# Patient Record
Sex: FEMALE | Race: WHITE | Employment: OTHER | URBAN - METROPOLITAN AREA
[De-identification: names, ages, dates, MRNs, and addresses within clinical notes are randomized per-mention and may not be internally consistent; named-entity substitution may affect disease eponyms.]

---

## 2023-04-27 ENCOUNTER — OFFICE VISIT (OUTPATIENT)
Dept: UROGYNECOLOGY | Age: 63
End: 2023-04-27
Payer: COMMERCIAL

## 2023-04-27 VITALS — WEIGHT: 191 LBS

## 2023-04-27 DIAGNOSIS — N39.41 URGE URINARY INCONTINENCE: Primary | ICD-10-CM

## 2023-04-27 DIAGNOSIS — R15.9 INCONTINENCE OF FECES, UNSPECIFIED FECAL INCONTINENCE TYPE: ICD-10-CM

## 2023-04-27 LAB
BILIRUBIN, URINE, POC: NEGATIVE
BLOOD URINE, POC: NEGATIVE
GLUCOSE URINE, POC: NEGATIVE
KETONES, URINE, POC: NEGATIVE
LEUKOCYTE ESTERASE, URINE, POC: NEGATIVE
NITRITE, URINE, POC: NEGATIVE
PH, URINE, POC: 6 (ref 4.6–8)
PROTEIN,URINE, POC: NEGATIVE
SPECIFIC GRAVITY, URINE, POC: 1.01 (ref 1–1.03)
URINALYSIS CLARITY, POC: CLEAR
URINALYSIS COLOR, POC: YELLOW
UROBILINOGEN, POC: NORMAL

## 2023-04-27 PROCEDURE — 81003 URINALYSIS AUTO W/O SCOPE: CPT | Performed by: OBSTETRICS & GYNECOLOGY

## 2023-04-27 PROCEDURE — 99204 OFFICE O/P NEW MOD 45 MIN: CPT | Performed by: OBSTETRICS & GYNECOLOGY

## 2023-04-27 PROCEDURE — 1123F ACP DISCUSS/DSCN MKR DOCD: CPT | Performed by: OBSTETRICS & GYNECOLOGY

## 2023-04-27 PROCEDURE — 51701 INSERT BLADDER CATHETER: CPT | Performed by: OBSTETRICS & GYNECOLOGY

## 2023-04-27 RX ORDER — ERGOCALCIFEROL 1.25 MG/1
CAPSULE ORAL
COMMUNITY
Start: 2023-04-17

## 2023-04-27 RX ORDER — CYANOCOBALAMIN (VITAMIN B-12) 500 MCG
TABLET ORAL
COMMUNITY

## 2023-04-27 RX ORDER — LEVOTHYROXINE SODIUM 112 UG/1
112 TABLET ORAL DAILY
COMMUNITY
Start: 2023-03-30

## 2023-04-27 RX ORDER — ALOSETRON HYDROCHLORIDE 0.5 MG/1
TABLET, FILM COATED ORAL
COMMUNITY
Start: 2023-04-22

## 2023-04-27 RX ORDER — NORETHINDRONE ACETATE AND ETHINYL ESTRADIOL .5; 2.5 MG/1; UG/1
TABLET ORAL
COMMUNITY
Start: 2020-03-23

## 2023-04-27 RX ORDER — ESCITALOPRAM OXALATE 10 MG/1
10 TABLET ORAL DAILY
COMMUNITY
Start: 2023-02-09

## 2023-04-27 RX ORDER — ASPIRIN 81 MG/1
TABLET, CHEWABLE ORAL
COMMUNITY

## 2023-04-27 RX ORDER — ESOMEPRAZOLE MAGNESIUM 40 MG/1
CAPSULE, DELAYED RELEASE ORAL DAILY
COMMUNITY
Start: 2023-02-26

## 2023-04-27 RX ORDER — SOLIFENACIN SUCCINATE 10 MG/1
1 TABLET, FILM COATED ORAL DAILY
COMMUNITY
Start: 2018-03-26

## 2023-04-27 RX ORDER — PNV NO.95/FERROUS FUM/FOLIC AC 28MG-0.8MG
TABLET ORAL
COMMUNITY

## 2023-04-27 NOTE — PATIENT INSTRUCTIONS
We discussed the differential diagnosis of fecal incontinence including stress, urge, and unconscious leakage. We discussed the implications of better coordination of the pelvic floor. We discussed the implications of better coordination of the pelvic floor. We discussed management of potential sphincter defects. We discussed the importance of stool consistency and the role of diet modification. The initial treatment of fecal incontinence is assuring that there is enough fiber in the diet. A high fiber diet with plenty of fluids (up to 8 glasses of water daily) is suggested to relieve these symptoms. Citrucel, Metamucil, Bene Fiber, Fibercon etc, are great supplements. The goal is to slowly work up to 25-30g of fiber daily. Patient Education        mirabegron  Pronunciation:  ARI braun  Brand:  Myrbetriq  What is the most important information I should know about mirabegron? You should not use mirabegron if you have uncontrolled hypertension (high blood pressure). What is mirabegron? Mirabegron is used in adults to treat overactive bladder with symptoms of frequent or urgent urination and urinary incontinence. Mirabegron is sometimes used together with another medicine called solifenacin (Vesicare). Mirabegron is used in children to treat neurogenic detrusor overactivity (NDO). Urinary incontinence caused by NDO is related to permanent nerve damage from conditions such as multiple sclerosis or spinal injury. Mirabegron tablets may be used in children at least 1years old. Mirabegron granules are for use in children at least 1years old who also weigh at least 77 pounds (35 kilograms). Mirabegron may also be used for purposes not listed in this medication guide. What should I discuss with my healthcare provider before taking mirabegron? You should not use mirabegron if you are allergic to it.   Tell your doctor if you have ever had:  high blood pressure;  a bladder obstruction;  trouble emptying

## 2023-04-27 NOTE — ASSESSMENT & PLAN NOTE
We discussed the differential diagnosis of overactive bladder. We discussed the epidemiology, pathogenesis, and etiology of bladder overactivity including the neurophysiologic axis. We also discussed the treatment pathway for OAB. I offered options which include nothing, medications, physical therapy, behavior modification, and neuromodulation. 1) Decrease bladder irritants  2) Physical Therapy: We discussed the purpose of physical therapy which is to strengthen the pelvic floor muscles and teach proper coordination of those muscles. I described the anatomy of those muscles involved and their relationship to the end-organs in the pelvis. I described therapy techniques which include a combination of therapeutic exercise, biofeedback, neuromuscular re-education, home programs, and electrical stimulation, as well as therapeutic massage and ultrasound for pain. She is on vesicare right now. We discussed that all medications in this class have similar efficacy. Side effects include dry eyes, dry mouth, constipation, difficulty with swallowing, blurry vision, and may lead to memory dysfunction. These medications are contraindicated in patients with closed/ narrow angle glaucoma, gastroparesis, dementia, Sjogren and Myasthenia gravis. She is aware of the risks and side effects.

## 2023-04-27 NOTE — PROGRESS NOTES
[facility]  [address]  Dept: [phone]        PCP:  Angely Lu MD    4/27/2023        HPI:  I am being asked to see this patient in consultation by Dr. Craig Maurice   for New Patient      Ms. Wolfgang Denver  has been leaking urine for 3 years. She leaks urine daytime. She does leak urine with cough, laugh, sneeze and activity even after urinating when she stands she have urine coming her leg. . She does leak urine with urgency. She  uses medium on long distant travel or during meetings and goes through 1. She leaks twice a week. When she leaks she leaks small amounts. She has not tried PT, she is taking  medication Vesicare for 10 years plus. She has not had procedures/surgery for this in the past.     With both urge incontinence and stress incontinence, urge incontinence bothers her the most.     Ms. Wolfgang Denver has been having bowel leakage for 3 years. When she leaks stool the stool is watery. She does leak stool with urgency. She  uses medium and goes through 1. She leaks 3 times per month. When she leaks she leaks moderate amounts. She does not have blood in her stool. She has not tried PT, she has not tried medication. She has not had procedures/surgery for this in the past.   She is currently taking medication for IBS, Alosetron      She voids 15 times during the day. She voids 1 times over night. She has 21 BM per week, and does not strain. She drinks 2 caffeine drinks beverages per day. She uses 0 artificial sweeteners per day. She drinks 4 alcoholic beverages per week. She has not pelvic surgery in the past.   Her last PAP: 05/13/2018    Her last Colonoscopy: 2016  Her last Mammogram: 202    She does not have a history of DM. No results found for: LABA1C  No results found for: EAG    She does have a history of sleep apnea. Tobacco: No    Sexual History: single partner, contraception - none.     Notes were reviewed from

## 2023-06-07 ENCOUNTER — OFFICE VISIT (OUTPATIENT)
Dept: UROGYNECOLOGY | Age: 63
End: 2023-06-07
Payer: COMMERCIAL

## 2023-06-07 DIAGNOSIS — R15.9 INCONTINENCE OF FECES, UNSPECIFIED FECAL INCONTINENCE TYPE: ICD-10-CM

## 2023-06-07 DIAGNOSIS — N39.41 URGE URINARY INCONTINENCE: ICD-10-CM

## 2023-06-07 PROCEDURE — 99212 OFFICE O/P EST SF 10 MIN: CPT | Performed by: OBSTETRICS & GYNECOLOGY

## 2023-06-07 PROCEDURE — 1123F ACP DISCUSS/DSCN MKR DOCD: CPT | Performed by: OBSTETRICS & GYNECOLOGY

## 2023-06-07 NOTE — PROGRESS NOTES
[facility]  [address]  Dept: [phone]    PCP:  Vamsi Powell MD    6/7/2023      HPI:  Kenna Guallpa is here to follow up on Follow-up (Myrbetriq follow up for UUI)  . Patient is here for a Myrbetriq 50mg, follow up, she decreased her fluids while on a trip back in May she is continued to do so when she have long days, unsure if the medication is helping. She is still working on the bladder diet and decrease caffeine intake. She is continued her PT with Jose Lara at [facility] in [place]. She is still taking it daily. She has had 2 PT apts. No results found for this visit on 06/07/23. There were no vitals taken for this visit. 1. Urge urinary incontinence  Assessment & Plan:  She is going to cont with PT and HEP. She is also going to cont Myrbetriq. 2. Incontinence of feces, unspecified fecal incontinence type  Assessment & Plan:  Cont with metamucil. .       Return in about 3 months (around 9/7/2023) for UUI.                       Sandhya Elam DO

## 2023-09-20 ENCOUNTER — OFFICE VISIT (OUTPATIENT)
Dept: UROGYNECOLOGY | Age: 63
End: 2023-09-20
Payer: COMMERCIAL

## 2023-09-20 VITALS — DIASTOLIC BLOOD PRESSURE: 74 MMHG | HEART RATE: 64 BPM | SYSTOLIC BLOOD PRESSURE: 128 MMHG

## 2023-09-20 DIAGNOSIS — N39.41 URGE URINARY INCONTINENCE: ICD-10-CM

## 2023-09-20 PROCEDURE — 1123F ACP DISCUSS/DSCN MKR DOCD: CPT | Performed by: OBSTETRICS & GYNECOLOGY

## 2023-09-20 PROCEDURE — 99212 OFFICE O/P EST SF 10 MIN: CPT | Performed by: OBSTETRICS & GYNECOLOGY

## 2023-09-20 NOTE — ASSESSMENT & PLAN NOTE
She would like to cont with HEP and Myrbetriq. She knows to call if symptoms change or new symptoms arise. We also discussed 3rd line therapies just so she knows that if her current therapies start to fail, we have other options.

## 2023-09-20 NOTE — PROGRESS NOTES
[facility] UR[facility]YNECOLOG[facility]  [address] [facility]  Dept: [phone]    PCP:  Braulio Rossi MD    9/20/2023      HPI:  Ula Romberg is here to follow up on No chief complaint on file. .    Patient is here for follow up Myrbetriq 50mg follow up and she still working on decreasing coffee and she was able to complete with Skyla Aguilar at [facility] in [place], and continue to do HEP. She is so much happier today. Going to PT with Skyla Aguilar has been great. She has gotten her life back. Her IBS is better controlled, her urinary symptoms are much improved. She is using a TENS unit (on her posterior tibial nerve) at home at that seems to work well. No results found for this visit on 09/20/23. /74 (Site: Left Upper Arm, Position: Sitting, Cuff Size: Medium Adult)   Pulse 64       1. Urge urinary incontinence  Assessment & Plan:  She would like to cont with HEP and Myrbetriq. She knows to call if symptoms change or new symptoms arise. We also discussed 3rd line therapies just so she knows that if her current therapies start to fail, we have other options. No follow-ups on file.                       Duy Zaragoza, DO

## 2024-04-09 RX ORDER — MIRABEGRON 50 MG/1
50 TABLET, FILM COATED, EXTENDED RELEASE ORAL DAILY
Qty: 30 TABLET | Refills: 11 | OUTPATIENT
Start: 2024-04-09

## 2024-09-20 ENCOUNTER — OFFICE VISIT (OUTPATIENT)
Dept: OBGYN CLINIC | Age: 64
End: 2024-09-20

## 2024-09-20 VITALS
WEIGHT: 191.2 LBS | HEIGHT: 65 IN | DIASTOLIC BLOOD PRESSURE: 64 MMHG | SYSTOLIC BLOOD PRESSURE: 112 MMHG | BODY MASS INDEX: 31.86 KG/M2

## 2024-09-20 DIAGNOSIS — Z78.0 MENOPAUSE: ICD-10-CM

## 2024-09-20 DIAGNOSIS — Z01.419 WELL WOMAN EXAM: Primary | ICD-10-CM

## 2024-09-20 DIAGNOSIS — Z13.89 SCREENING FOR GENITOURINARY CONDITION: ICD-10-CM

## 2024-09-20 DIAGNOSIS — Z12.31 SCREENING MAMMOGRAM, ENCOUNTER FOR: ICD-10-CM

## 2024-09-20 LAB
BILIRUBIN, URINE, POC: NEGATIVE
BLOOD URINE, POC: NEGATIVE
GLUCOSE URINE, POC: NEGATIVE
KETONES, URINE, POC: NEGATIVE
LEUKOCYTE ESTERASE, URINE, POC: NEGATIVE
NITRITE, URINE, POC: NEGATIVE
PH, URINE, POC: 6 (ref 4.6–8)
PROTEIN,URINE, POC: NEGATIVE
SPECIFIC GRAVITY, URINE, POC: 1.01 (ref 1–1.03)
URINALYSIS CLARITY, POC: CLEAR
URINALYSIS COLOR, POC: NORMAL
UROBILINOGEN, POC: NORMAL

## 2024-09-20 RX ORDER — ESTRADIOL 0.05 MG/D
1 PATCH, EXTENDED RELEASE TRANSDERMAL
Qty: 8 PATCH | Refills: 2 | Status: SHIPPED | OUTPATIENT
Start: 2024-09-23

## 2024-09-20 RX ORDER — PROGESTERONE 100 MG/1
100 CAPSULE ORAL NIGHTLY
Qty: 30 CAPSULE | Refills: 1 | Status: SHIPPED | OUTPATIENT
Start: 2024-09-20

## 2024-09-20 RX ORDER — ATORVASTATIN CALCIUM 10 MG/1
10 TABLET, FILM COATED ORAL NIGHTLY
COMMUNITY
Start: 2024-09-14

## 2024-09-20 ASSESSMENT — PATIENT HEALTH QUESTIONNAIRE - PHQ9
1. LITTLE INTEREST OR PLEASURE IN DOING THINGS: NOT AT ALL
SUM OF ALL RESPONSES TO PHQ9 QUESTIONS 1 & 2: 0
SUM OF ALL RESPONSES TO PHQ QUESTIONS 1-9: 0
2. FEELING DOWN, DEPRESSED OR HOPELESS: NOT AT ALL
SUM OF ALL RESPONSES TO PHQ QUESTIONS 1-9: 0

## 2024-09-26 LAB
COLLECTION METHOD: NORMAL
CYTOLOGIST CVX/VAG CYTO: NORMAL
CYTOLOGY CVX/VAG DOC THIN PREP: NORMAL
DATE OF LMP: NORMAL
HPV APTIMA: NEGATIVE
Lab: NORMAL
PAP SOURCE: NORMAL
PATH REPORT.FINAL DX SPEC: NORMAL
STAT OF ADQ CVX/VAG CYTO-IMP: NORMAL

## 2024-11-12 RX ORDER — PROGESTERONE 100 MG/1
100 CAPSULE ORAL NIGHTLY
Qty: 30 CAPSULE | Refills: 1 | Status: SHIPPED | OUTPATIENT
Start: 2024-11-12

## 2024-11-22 ENCOUNTER — TELEPHONE (OUTPATIENT)
Dept: OBGYN CLINIC | Age: 64
End: 2024-11-22

## 2024-11-22 NOTE — TELEPHONE ENCOUNTER
Received a fax from [employer] stating that estradiol 0.05 mg patches needs prior authorization. PA submitted and approved on cover my meds from 11/1/24-11/21/25

## 2024-11-26 RX ORDER — ESTRADIOL 0.05 MG/D
PATCH, EXTENDED RELEASE TRANSDERMAL
Qty: 8 PATCH | Refills: 2 | OUTPATIENT
Start: 2024-11-26

## 2024-12-02 ENCOUNTER — OFFICE VISIT (OUTPATIENT)
Dept: OBGYN CLINIC | Age: 64
End: 2024-12-02
Payer: MEDICARE

## 2024-12-02 VITALS
BODY MASS INDEX: 33.15 KG/M2 | DIASTOLIC BLOOD PRESSURE: 74 MMHG | HEIGHT: 65 IN | WEIGHT: 199 LBS | SYSTOLIC BLOOD PRESSURE: 122 MMHG

## 2024-12-02 DIAGNOSIS — N95.9 POSTMENOPAUSAL SYMPTOMS: Primary | ICD-10-CM

## 2024-12-02 PROCEDURE — 1159F MED LIST DOCD IN RCRD: CPT | Performed by: OBSTETRICS & GYNECOLOGY

## 2024-12-02 PROCEDURE — 1090F PRES/ABSN URINE INCON ASSESS: CPT | Performed by: OBSTETRICS & GYNECOLOGY

## 2024-12-02 PROCEDURE — G8399 PT W/DXA RESULTS DOCUMENT: HCPCS | Performed by: OBSTETRICS & GYNECOLOGY

## 2024-12-02 PROCEDURE — G8427 DOCREV CUR MEDS BY ELIG CLIN: HCPCS | Performed by: OBSTETRICS & GYNECOLOGY

## 2024-12-02 PROCEDURE — 1123F ACP DISCUSS/DSCN MKR DOCD: CPT | Performed by: OBSTETRICS & GYNECOLOGY

## 2024-12-02 PROCEDURE — 3017F COLORECTAL CA SCREEN DOC REV: CPT | Performed by: OBSTETRICS & GYNECOLOGY

## 2024-12-02 PROCEDURE — 99214 OFFICE O/P EST MOD 30 MIN: CPT | Performed by: OBSTETRICS & GYNECOLOGY

## 2024-12-02 PROCEDURE — 1036F TOBACCO NON-USER: CPT | Performed by: OBSTETRICS & GYNECOLOGY

## 2024-12-02 PROCEDURE — G8484 FLU IMMUNIZE NO ADMIN: HCPCS | Performed by: OBSTETRICS & GYNECOLOGY

## 2024-12-02 PROCEDURE — G8417 CALC BMI ABV UP PARAM F/U: HCPCS | Performed by: OBSTETRICS & GYNECOLOGY

## 2024-12-02 ASSESSMENT — PATIENT HEALTH QUESTIONNAIRE - PHQ9
SUM OF ALL RESPONSES TO PHQ QUESTIONS 1-9: 0
1. LITTLE INTEREST OR PLEASURE IN DOING THINGS: NOT AT ALL
SUM OF ALL RESPONSES TO PHQ9 QUESTIONS 1 & 2: 0
2. FEELING DOWN, DEPRESSED OR HOPELESS: NOT AT ALL
SUM OF ALL RESPONSES TO PHQ QUESTIONS 1-9: 0

## 2024-12-02 NOTE — PROGRESS NOTES
The patient is a 64 y.o.  who is seen for medication follow up after starting vivelle 0.05 mg patches and prometrium 100 mg. At her last visit she was on fem hrt and was advised to switch to transdermal. Has been on HRT for approximately 10 years. She has been having some pain in her left breast and states that she has also gained weight within the last 6 weeks. Left breast pain is always in the same location and she is not having pain every day, it comes and goes. Unsure if related to starting the medication.     HISTORY:      No LMP recorded. Patient is postmenopausal.  Sexual History:  single partner, contraception - post menopausal status    Current Outpatient Medications on File Prior to Visit   Medication Sig Dispense Refill    progesterone (PROMETRIUM) 100 MG CAPS capsule Take 1 capsule by mouth nightly 30 capsule 1    atorvastatin (LIPITOR) 10 MG tablet Take 1 tablet by mouth nightly      Cyanocobalamin (VITAMIN B-12 PO) Take by mouth      estradiol (VIVELLE) 0.05 MG/24HR Place 1 patch onto the skin Twice a Week 8 patch 2    alosetron (LOTRONEX) 0.5 MG tablet       Ascorbic Acid (VITAMIN C) 500 MG CHEW       aspirin 81 MG chewable tablet       vitamin D (ERGOCALCIFEROL) 1.25 MG (26258 UT) CAPS capsule TAKE 1 CAPSULE BY MOUTH WEEKLY      escitalopram (LEXAPRO) 10 MG tablet Take 1 tablet by mouth daily      esomeprazole (NEXIUM) 40 MG delayed release capsule Take by mouth daily      Folic Acid 0.8 MG CAPS       Krill Oil Omega-3 500 MG CAPS       levothyroxine (SYNTHROID) 112 MCG tablet Take 1 tablet by mouth daily      zinc 50 MG CAPS       mirabegron (MYRBETRIQ) 50 MG TB24 Take 50 mg by mouth daily 30 tablet 11     No current facility-administered medications on file prior to visit.       ROS:  Feeling well. No dyspnea or chest pain on exertion.  No abdominal pain, change in bowel habits, black or bloody stools.  No urinary tract symptoms. GYN ROS: no breast pain or new or enlarging lumps on

## 2024-12-19 RX ORDER — PROGESTERONE 100 MG/1
100 CAPSULE ORAL NIGHTLY
Qty: 30 CAPSULE | Refills: 1 | OUTPATIENT
Start: 2024-12-19

## 2024-12-26 RX ORDER — ESTRADIOL 0.05 MG/D
1 PATCH, EXTENDED RELEASE TRANSDERMAL
Qty: 24 PATCH | Refills: 1 | Status: SHIPPED | OUTPATIENT
Start: 2024-12-26

## 2025-02-08 ENCOUNTER — PATIENT MESSAGE (OUTPATIENT)
Dept: OBGYN CLINIC | Age: 65
End: 2025-02-08

## 2025-02-08 DIAGNOSIS — N95.9 POSTMENOPAUSAL SYMPTOMS: Primary | ICD-10-CM

## 2025-02-10 RX ORDER — PROGESTERONE 100 MG/1
100 CAPSULE ORAL NIGHTLY
Qty: 30 CAPSULE | Refills: 1 | Status: CANCELLED | OUTPATIENT
Start: 2025-02-10

## 2025-02-11 RX ORDER — NORETHINDRONE ACETATE AND ETHINYL ESTRADIOL .5; 2.5 MG/1; UG/1
1 TABLET ORAL DAILY
Qty: 90 TABLET | Refills: 1 | Status: SHIPPED | OUTPATIENT
Start: 2025-02-11

## 2025-02-11 NOTE — TELEPHONE ENCOUNTER
Previous dose in chart, FEMHRT low dose 0.5-2.5 mg-mcg per tablet.       Milagros Freeman MD  You15 hours ago (4:20 PM)       Does she feel like she did not gain wait on the synthetics? Rationale for transdermal dec thromboembolic events theoretical lower risk of cancer  I am ok with her switching back if she would like  Ok to do 3 mos at a time  Otherwise ok to change back femhrt- with associated higher risks

## 2025-07-22 DIAGNOSIS — N95.9 POSTMENOPAUSAL SYMPTOMS: ICD-10-CM

## 2025-07-23 RX ORDER — NORETHINDRONE ACETATE AND ETHINYL ESTRADIOL .5; 2.5 MG/1; UG/1
1 TABLET ORAL DAILY
Qty: 90 TABLET | Refills: 0 | Status: SHIPPED | OUTPATIENT
Start: 2025-07-23